# Patient Record
Sex: MALE | Race: WHITE | NOT HISPANIC OR LATINO | URBAN - METROPOLITAN AREA
[De-identification: names, ages, dates, MRNs, and addresses within clinical notes are randomized per-mention and may not be internally consistent; named-entity substitution may affect disease eponyms.]

---

## 2024-11-15 ENCOUNTER — APPOINTMENT (OUTPATIENT)
Dept: PRIMARY CARE | Facility: CLINIC | Age: 29
End: 2024-11-15

## 2024-11-15 ENCOUNTER — TELEPHONE (OUTPATIENT)
Dept: PRIMARY CARE | Facility: CLINIC | Age: 29
End: 2024-11-15

## 2024-11-15 ENCOUNTER — LAB (OUTPATIENT)
Dept: LAB | Facility: LAB | Age: 29
End: 2024-11-15
Payer: COMMERCIAL

## 2024-11-15 VITALS — WEIGHT: 262 LBS | DIASTOLIC BLOOD PRESSURE: 74 MMHG | SYSTOLIC BLOOD PRESSURE: 132 MMHG

## 2024-11-15 DIAGNOSIS — Z72.0 TOBACCO USE: ICD-10-CM

## 2024-11-15 DIAGNOSIS — Z00.00 HEALTH CARE MAINTENANCE: ICD-10-CM

## 2024-11-15 DIAGNOSIS — Z00.00 HEALTH CARE MAINTENANCE: Primary | ICD-10-CM

## 2024-11-15 DIAGNOSIS — R03.0 BLOOD PRESSURE ELEVATED WITHOUT HISTORY OF HTN: ICD-10-CM

## 2024-11-15 LAB
ALBUMIN SERPL BCP-MCNC: 4.6 G/DL (ref 3.4–5)
ALP SERPL-CCNC: 71 U/L (ref 33–120)
ALT SERPL W P-5'-P-CCNC: 24 U/L (ref 10–52)
ANION GAP SERPL CALC-SCNC: 15 MMOL/L (ref 10–20)
AST SERPL W P-5'-P-CCNC: 26 U/L (ref 9–39)
BILIRUB SERPL-MCNC: 0.5 MG/DL (ref 0–1.2)
BUN SERPL-MCNC: 13 MG/DL (ref 6–23)
CALCIUM SERPL-MCNC: 10 MG/DL (ref 8.6–10.6)
CHLORIDE SERPL-SCNC: 102 MMOL/L (ref 98–107)
CHOLEST SERPL-MCNC: 283 MG/DL (ref 0–199)
CHOLESTEROL/HDL RATIO: 6.9
CO2 SERPL-SCNC: 24 MMOL/L (ref 21–32)
CREAT SERPL-MCNC: 0.98 MG/DL (ref 0.5–1.3)
EGFRCR SERPLBLD CKD-EPI 2021: >90 ML/MIN/1.73M*2
ERYTHROCYTE [DISTWIDTH] IN BLOOD BY AUTOMATED COUNT: 12.2 % (ref 11.5–14.5)
EST. AVERAGE GLUCOSE BLD GHB EST-MCNC: 108 MG/DL
GLUCOSE SERPL-MCNC: 156 MG/DL (ref 74–99)
HBA1C MFR BLD: 5.4 %
HCT VFR BLD AUTO: 48 % (ref 41–52)
HDLC SERPL-MCNC: 41.1 MG/DL
HGB BLD-MCNC: 16.5 G/DL (ref 13.5–17.5)
LDLC SERPL CALC-MCNC: 183 MG/DL
MCH RBC QN AUTO: 32.7 PG (ref 26–34)
MCHC RBC AUTO-ENTMCNC: 34.4 G/DL (ref 32–36)
MCV RBC AUTO: 95 FL (ref 80–100)
NON HDL CHOLESTEROL: 242 MG/DL (ref 0–149)
NRBC BLD-RTO: 0 /100 WBCS (ref 0–0)
PLATELET # BLD AUTO: 318 X10*3/UL (ref 150–450)
POTASSIUM SERPL-SCNC: 4 MMOL/L (ref 3.5–5.3)
PROT SERPL-MCNC: 7.3 G/DL (ref 6.4–8.2)
RBC # BLD AUTO: 5.04 X10*6/UL (ref 4.5–5.9)
SODIUM SERPL-SCNC: 137 MMOL/L (ref 136–145)
TRIGL SERPL-MCNC: 297 MG/DL (ref 0–149)
TSH SERPL-ACNC: 1.37 MIU/L (ref 0.44–3.98)
VLDL: 59 MG/DL (ref 0–40)
WBC # BLD AUTO: 11.5 X10*3/UL (ref 4.4–11.3)

## 2024-11-15 PROCEDURE — 84443 ASSAY THYROID STIM HORMONE: CPT

## 2024-11-15 PROCEDURE — 85027 COMPLETE CBC AUTOMATED: CPT

## 2024-11-15 PROCEDURE — 80053 COMPREHEN METABOLIC PANEL: CPT

## 2024-11-15 PROCEDURE — 83036 HEMOGLOBIN GLYCOSYLATED A1C: CPT

## 2024-11-15 PROCEDURE — 80061 LIPID PANEL: CPT

## 2024-11-15 PROCEDURE — 36415 COLL VENOUS BLD VENIPUNCTURE: CPT

## 2024-11-15 PROCEDURE — 99395 PREV VISIT EST AGE 18-39: CPT | Performed by: INTERNAL MEDICINE

## 2024-11-15 NOTE — PROGRESS NOTES
OkaySubjective   Patient ID: Carlo Hernandez is a 29 y.o. male who presents for Annual Exam.    HPI CPE see updated front sheet have not seen in some time no chest pain no shortness of breath having some issue regarding metabolism is gaining weight bowels okay no dysuria bones muscles joints okay    Past medical history elevated blood pressure    Medications none at the current time    Allergies no known drug allergies    Social history tobacco 1 pack/day alcohol 3 drinks per day    Family history hypertension hyperlipidemia    Prevention Limited exercise some prior results reviewed discussed with vaccinations    Review of Systems    Objective   /74   Wt 119 kg (262 lb)     Physical Exam  Vital signs noted alert and oriented x 3 NCAT PERRLA EOMI nares without discharge OP benign TM normal bilateral EAC clear bilateral no AC nodes no JVD no thyromegaly chest clear to auscultation CV regular rate and rhythm S1-S2 without murmur gallop or rub abdomen soft nontender obese normal active bowel sounds normal curvature negative straight leg raise extremities no clubbing cyanosis or edema normal distal pulses DTR 1+  Assessment/Plan     Impression General Medical examination obesity elevated blood pressure tobacco alcohol  Plan check comprehensive panel visit glucose potassium and kidney function as well as liver function check A1c advised on long-term blood sugar test check TSH advised on thyroid and metabolism stop drinking stop smoking normal together good diet regular exercise good water consumption check CBC advised on blood count check lipid panel advised on cholesterol profile advised on if any medication is needed follow-up watching salt in diet and recheck 3 months based on above TT 60 cc 31

## 2025-01-26 ENCOUNTER — OFFICE VISIT (OUTPATIENT)
Dept: URGENT CARE | Age: 30
End: 2025-01-26
Payer: COMMERCIAL

## 2025-01-26 VITALS
HEIGHT: 71 IN | OXYGEN SATURATION: 98 % | SYSTOLIC BLOOD PRESSURE: 154 MMHG | RESPIRATION RATE: 20 BRPM | TEMPERATURE: 97.8 F | HEART RATE: 88 BPM | DIASTOLIC BLOOD PRESSURE: 116 MMHG | WEIGHT: 265 LBS | BODY MASS INDEX: 37.1 KG/M2

## 2025-01-26 DIAGNOSIS — J01.90 ACUTE NON-RECURRENT SINUSITIS, UNSPECIFIED LOCATION: Primary | ICD-10-CM

## 2025-01-26 DIAGNOSIS — K52.9 GASTROENTERITIS: ICD-10-CM

## 2025-01-26 DIAGNOSIS — R03.0 ELEVATED BLOOD PRESSURE READING IN OFFICE WITHOUT DIAGNOSIS OF HYPERTENSION: ICD-10-CM

## 2025-01-26 PROCEDURE — 99203 OFFICE O/P NEW LOW 30 MIN: CPT | Performed by: STUDENT IN AN ORGANIZED HEALTH CARE EDUCATION/TRAINING PROGRAM

## 2025-01-26 PROCEDURE — 99070 SPECIAL SUPPLIES PHYS/QHP: CPT | Performed by: STUDENT IN AN ORGANIZED HEALTH CARE EDUCATION/TRAINING PROGRAM

## 2025-01-26 PROCEDURE — 3008F BODY MASS INDEX DOCD: CPT | Performed by: STUDENT IN AN ORGANIZED HEALTH CARE EDUCATION/TRAINING PROGRAM

## 2025-01-26 RX ORDER — AMOXICILLIN AND CLAVULANATE POTASSIUM 875; 125 MG/1; MG/1
1 TABLET, FILM COATED ORAL 2 TIMES DAILY
Qty: 14 TABLET | Refills: 0 | Status: SHIPPED | OUTPATIENT
Start: 2025-01-26 | End: 2025-02-02

## 2025-01-26 ASSESSMENT — ENCOUNTER SYMPTOMS
SINUS PRESSURE: 1
NAUSEA: 1
DIARRHEA: 1
VOMITING: 1
SINUS PAIN: 1

## 2025-01-26 NOTE — PROGRESS NOTES
"Subjective   Patient ID: Carlo Hernandez is a 29 y.o. male. They present today with a chief complaint of flu sx (Sinus infection, runny nose, vomiting, cough, abdomin cough, diarrhea.  ).    History of Present Illness  Pt presents with 2 complaints    1) possible sinus infection. 2 weeks with congestion, sinus pressure and pain, PND, ears popping. Using saline navage with temporary relief. Hx of seasonal allergies however current sx more persisent. No fever, chills, cough, cp, sob.     2) N/V/D. Started yesterday morning. He and his girlfriend went to [employer] for dinner on Thursday night. Thought parts of the salad they ate looked bad. By Friday, patient yvetten was sick with nvd and then by Saturday morning, pt had same symptoms. N/v/d has resolved. Drinking and urinating normally. No abd pain, urinary sx, blood in stool or emesis       History provided by:  Patient      Past Medical History  Allergies as of 01/26/2025    (No Known Allergies)       (Not in a hospital admission)       History reviewed. No pertinent past medical history.    History reviewed. No pertinent surgical history.     reports that he has been smoking cigarettes. He has never used smokeless tobacco.    Review of Systems  Review of Systems   HENT:  Positive for congestion, sinus pressure and sinus pain.    Gastrointestinal:  Positive for diarrhea, nausea and vomiting.   All other systems reviewed and are negative.                                 Objective    Vitals:    01/26/25 1258   BP: (!) 154/116   Pulse: 88   Resp: 20   Temp: 36.6 °C (97.8 °F)   SpO2: 98%   Weight: 120 kg (265 lb)   Height: 1.803 m (5' 11\")     No LMP for male patient.    Physical Exam  Vitals and nursing note reviewed.   Constitutional:       General: He is not in acute distress.     Appearance: Normal appearance. He is not ill-appearing, toxic-appearing or diaphoretic.   HENT:      Head: Normocephalic and atraumatic.      Right Ear: Tympanic membrane, ear canal and " external ear normal.      Left Ear: Tympanic membrane, ear canal and external ear normal.      Nose: Congestion present.      Right Nostril: No foreign body, epistaxis, septal hematoma or occlusion.      Left Nostril: No foreign body, epistaxis, septal hematoma or occlusion.      Right Turbinates: Enlarged and swollen. Not pale.      Left Turbinates: Enlarged. Not swollen or pale.      Right Sinus: No maxillary sinus tenderness or frontal sinus tenderness.      Left Sinus: No maxillary sinus tenderness.      Mouth/Throat:      Lips: Pink.      Mouth: Mucous membranes are moist.      Pharynx: Oropharynx is clear. Uvula midline. No pharyngeal swelling, oropharyngeal exudate, posterior oropharyngeal erythema, uvula swelling or postnasal drip.      Tonsils: No tonsillar exudate or tonsillar abscesses.   Cardiovascular:      Rate and Rhythm: Normal rate and regular rhythm.      Pulses: Normal pulses.      Heart sounds: No murmur heard.  Pulmonary:      Effort: Pulmonary effort is normal. No respiratory distress.      Breath sounds: No wheezing, rhonchi or rales.   Abdominal:      General: Bowel sounds are normal. There is no distension.      Palpations: Abdomen is soft. There is no mass.      Tenderness: There is no abdominal tenderness. There is no right CVA tenderness, left CVA tenderness, guarding or rebound.      Hernia: No hernia is present.   Skin:     Capillary Refill: Capillary refill takes less than 2 seconds.      Findings: No rash.   Neurological:      Mental Status: He is alert.         Procedures    Point of Care Test & Imaging Results from this visit  No results found for this visit on 01/26/25.   No results found.    Diagnostic study results (if any) were reviewed by Peri Hidalgo PA-C.    Assessment/Plan   Allergies, medications, history, and pertinent labs/EKGs/Imaging reviewed by Peri Hidalgo PA-C.     Medical Decision Making  N/v/d resolved. No abd ttp. Vitals stable. Appears well hydrated and  "perfused. Suspect viral gastroenteritis or food borne illness. Oral rehydrated. ED precautions discussed    Will treat for sinusitis with augmentin. Advised may also cause GI distress. Add probiotic. Supportive care.     Bp elevated to 154/116. Has been \"keeping eye on it\" with pcp. Plans to message his pcp to make follow up appt. Advised lifestyle modifications. Provided log to check bp at home and present to pcp at next appt, follow up as soon as possible     Orders and Diagnoses  Diagnoses and all orders for this visit:  Acute non-recurrent sinusitis, unspecified location  -     amoxicillin-pot clavulanate (Augmentin) 875-125 mg tablet; Take 1 tablet by mouth 2 times a day for 7 days. Discard additional 6 tablets left in bottle at your local pharmacy once you are finished with the script  Gastroenteritis  Elevated blood pressure reading in office without diagnosis of hypertension      Medical Admin Record      Patient disposition: Home    Electronically signed by Peri Hidalgo PA-C  2:04 PM      "

## 2025-01-26 NOTE — PATIENT INSTRUCTIONS
Add Pedialyte. Add probiotic.     Please follow up with your primary provider or return to urgent care within one week if your symptoms do not improve.  You may schedule an appointment online at [email or web address] or call [phone]. Go to the Emergency Department if symptoms significantly worsen or if you develop symptoms including but not limited to abdominal/back/flank pain, vomiting and unable to tolerate oral intake, fever/chills, inability to urinate, chest pain or shortness of breath.

## 2025-09-03 ENCOUNTER — OFFICE VISIT (OUTPATIENT)
Dept: URGENT CARE | Age: 30
End: 2025-09-03
Payer: COMMERCIAL

## 2025-09-03 VITALS
SYSTOLIC BLOOD PRESSURE: 146 MMHG | TEMPERATURE: 98.3 F | HEIGHT: 71 IN | DIASTOLIC BLOOD PRESSURE: 94 MMHG | RESPIRATION RATE: 20 BRPM | HEART RATE: 85 BPM | BODY MASS INDEX: 33.6 KG/M2 | OXYGEN SATURATION: 99 % | WEIGHT: 240 LBS

## 2025-09-03 DIAGNOSIS — J32.9 BACTERIAL SINUSITIS: Primary | ICD-10-CM

## 2025-09-03 DIAGNOSIS — R03.0 ELEVATED BLOOD PRESSURE READING WITHOUT DIAGNOSIS OF HYPERTENSION: ICD-10-CM

## 2025-09-03 DIAGNOSIS — B96.89 BACTERIAL SINUSITIS: Primary | ICD-10-CM

## 2025-09-03 RX ORDER — LEVOCETIRIZINE DIHYDROCHLORIDE 5 MG/1
5 TABLET, FILM COATED ORAL EVERY EVENING
Qty: 30 TABLET | Refills: 0 | Status: SHIPPED | OUTPATIENT
Start: 2025-09-03 | End: 2025-10-03

## 2025-09-03 RX ORDER — AMOXICILLIN AND CLAVULANATE POTASSIUM 875; 125 MG/1; MG/1
875 TABLET, FILM COATED ORAL 2 TIMES DAILY
Qty: 14 TABLET | Refills: 0 | Status: SHIPPED | OUTPATIENT
Start: 2025-09-03 | End: 2025-09-10

## 2025-09-03 ASSESSMENT — ENCOUNTER SYMPTOMS
APPETITE CHANGE: 0
CONSTIPATION: 0
SORE THROAT: 0
EYE PAIN: 0
ARTHRALGIAS: 0
SINUS PAIN: 1
RHINORRHEA: 0
CHEST TIGHTNESS: 0
FEVER: 0
BACK PAIN: 0
FATIGUE: 0
DIFFICULTY URINATING: 0
MYALGIAS: 0
NAUSEA: 0
FACIAL SWELLING: 0
CHILLS: 0
COUGH: 0
SINUS PRESSURE: 1
VOMITING: 0
WOUND: 0
HEADACHES: 1
PALPITATIONS: 0
SINUS COMPLAINT: 1
SHORTNESS OF BREATH: 0
DIZZINESS: 0
JOINT SWELLING: 0
DIARRHEA: 0
ABDOMINAL PAIN: 0

## 2025-09-03 ASSESSMENT — VISUAL ACUITY: OU: 1
